# Patient Record
Sex: MALE
[De-identification: names, ages, dates, MRNs, and addresses within clinical notes are randomized per-mention and may not be internally consistent; named-entity substitution may affect disease eponyms.]

---

## 2022-10-26 PROBLEM — Z00.00 ENCOUNTER FOR PREVENTIVE HEALTH EXAMINATION: Status: ACTIVE | Noted: 2022-10-26

## 2022-10-27 ENCOUNTER — APPOINTMENT (OUTPATIENT)
Dept: NEUROSURGERY | Facility: CLINIC | Age: 57
End: 2022-10-27

## 2022-10-27 DIAGNOSIS — C85.90 NON-HODGKIN LYMPHOMA, UNSPECIFIED, UNSPECIFIED SITE: ICD-10-CM

## 2022-10-27 PROCEDURE — EDU01: CPT

## 2022-10-28 PROBLEM — C85.90 LYMPHOMA: Status: ACTIVE | Noted: 2022-10-28

## 2022-10-28 NOTE — REASON FOR VISIT
[Home] : at home, [unfilled] , at the time of the visit. [FreeTextEntry1] : Patient has had previous surgery with me. He called today to discuss his current medical opinion and to seek out any advise that could be offered. A discussion occurred regarding his current diagnosis and treatment plan. as well as the future plan. i had nothing new to offer at this point and was able to agree with his current medical care plan.\par \par Patient was encouraged to call back if he had any other questions or concerns.